# Patient Record
Sex: MALE | Race: WHITE | NOT HISPANIC OR LATINO | Employment: FULL TIME | URBAN - METROPOLITAN AREA
[De-identification: names, ages, dates, MRNs, and addresses within clinical notes are randomized per-mention and may not be internally consistent; named-entity substitution may affect disease eponyms.]

---

## 2023-03-20 NOTE — TELEPHONE ENCOUNTER
DIAGNOSIS: right shoulder    APPOINTMENT DATE: 4.4.23   NOTES STATUS DETAILS     Action March 20, 2023 2:56 PM    Action Taken No other significant past medical history

## 2023-04-03 DIAGNOSIS — M25.511 RIGHT SHOULDER PAIN: Primary | ICD-10-CM

## 2023-04-04 ENCOUNTER — ANCILLARY PROCEDURE (OUTPATIENT)
Dept: GENERAL RADIOLOGY | Facility: CLINIC | Age: 43
End: 2023-04-04
Attending: FAMILY MEDICINE
Payer: COMMERCIAL

## 2023-04-04 ENCOUNTER — OFFICE VISIT (OUTPATIENT)
Dept: ORTHOPEDICS | Facility: CLINIC | Age: 43
End: 2023-04-04
Payer: COMMERCIAL

## 2023-04-04 ENCOUNTER — PRE VISIT (OUTPATIENT)
Dept: ORTHOPEDICS | Facility: CLINIC | Age: 43
End: 2023-04-04

## 2023-04-04 DIAGNOSIS — M25.511 RIGHT SHOULDER PAIN, UNSPECIFIED CHRONICITY: ICD-10-CM

## 2023-04-04 DIAGNOSIS — M79.2 RADICULAR PAIN IN RIGHT ARM: ICD-10-CM

## 2023-04-04 DIAGNOSIS — M25.511 RIGHT SHOULDER PAIN: ICD-10-CM

## 2023-04-04 DIAGNOSIS — M50.30 DEGENERATION OF CERVICAL INTERVERTEBRAL DISC: Primary | ICD-10-CM

## 2023-04-04 PROCEDURE — 73030 X-RAY EXAM OF SHOULDER: CPT | Mod: RT | Performed by: RADIOLOGY

## 2023-04-04 PROCEDURE — 72040 X-RAY EXAM NECK SPINE 2-3 VW: CPT | Mod: GC | Performed by: RADIOLOGY

## 2023-04-04 PROCEDURE — 99203 OFFICE O/P NEW LOW 30 MIN: CPT | Performed by: FAMILY MEDICINE

## 2023-04-04 NOTE — PROGRESS NOTES
"Sports Medicine Clinic Visit    PCP: No Ref-Primary, Physician    Bean Dee is a 42 year old male who is seen  as self referral presenting with intermittent right radicular arm pain/tingling He states pain has radiated into the posterior aspect of his right shoulder and into his right elbow. He describes pain as a throbbing, pinching, tingling and numbness that at times can radiate down to the hand.  It happens intermittently.  He tends to notice it most often at home.  He denies neck pain.  Its not present when he first wakes in the morning.  He describes it more as a \"nuisance discomfort\".  Does not cause impingement shoulder pain or bother his shoulder movements at work.    Injury: unknown,. Reaching overhead and lifting heavy objects.     Location of Pain: right shoulder  Duration of Pain: 1.5 month(s)  Rating of Pain: 5/10  Pain is better with: massage and icy hot patches.   Pain is worse with: being at a resting position. He has a difficult time finding a comfortable position.  Additional Features: He right hand dominant. 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   Prior History of related problems: he mentions a few years he did sleep on his right side awkwardly and had similar issues but symptoms resolved in 7-10 days. He has not been seen for this issue before.     There were no vitals taken for this visit.          PMH:    Surgical History    Surgical History  Surgery Date Site/Laterality Comments   SEPTOPLASTY          Active problem list:  There is no problem list on file for this patient.      FH:  No family history on file.    SH:  Social History     Socioeconomic History     Marital status: Single     Spouse name: Not on file     Number of children: Not on file     Years of education: Not on file     Highest education level: Not on file   Occupational History     Not on file   Tobacco Use     Smoking status: " Not on file     Smokeless tobacco: Not on file   Substance and Sexual Activity     Alcohol use: Not on file     Drug use: Not on file     Sexual activity: Not on file   Other Topics Concern     Not on file   Social History Narrative     Not on file     Social Determinants of Health     Financial Resource Strain: Not on file   Food Insecurity: Not on file   Transportation Needs: Not on file   Physical Activity: Not on file   Stress: Not on file   Social Connections: Not on file   Intimate Partner Violence: Not on file   Housing Stability: Not on file       MEDS:  See EMR, reviewed  ALL:  See EMR, reviewed    REVIEW OF SYSTEMS:  CONSTITUTIONAL:NEGATIVE for fever, chills, change in weight  INTEGUMENTARY/SKIN: NEGATIVE for worrisome rashes, moles or lesions  EYES: NEGATIVE for vision changes or irritation  ENT/MOUTH: NEGATIVE for ear, mouth and throat problems  RESP:NEGATIVE for significant cough or SOB  BREAST: NEGATIVE for masses, tenderness or discharge  CV: NEGATIVE for chest pain, palpitations or peripheral edema  GI: NEGATIVE for nausea, abdominal pain, heartburn, or change in bowel habits  :NEGATIVE for frequency, dysuria, or hematuria  :NEGATIVE for frequency, dysuria, or hematuria  NEURO: NEGATIVE for weakness, dizziness or paresthesias  ENDOCRINE: NEGATIVE for temperature intolerance, skin/hair changes  HEME/ALLERGY/IMMUNE: NEGATIVE for bleeding problems  PSYCHIATRIC: NEGATIVE for changes in mood or affect        Objective: He has full range of motion at the cervical spine.  Neck extension causes some mild discomfort but no radicular pain.  He has 5 out of 5 strength bilaterally at deltoid, supraspinatus, infraspinatus, subscapularis, biceps, triceps, wrist extension, wrist flexion, grasp, digit he minimi strength.  Sensation is normal in the bilateral upper extremities.  No swelling in either upper extremity.  Phalen's sign and Tinel's sign are negative on the right.  No impingement signs at the right  shoulder.  Appropriate conversation and affect.      I personally viewed the patient x-rays of the right shoulder that shows no significant degenerative change and x-ray of the cervical spine that shows mild loss of cervical disc space at C5-C6 and C6-C7 with small anterior spurs.  No significant degenerative change.  X-rays over read with me by radiology.      Assessment: Cervical spine degenerative disc disease with intermittent right radicular arm discomfort    Plan: Patient would like to see physical therapy at [facility] for maintenance program for cervical spine.  We discussed that if the problem of cervical radicular symptoms of numbness or tingling radiating pain become more prominent or persistent that an MRI of the cervical spine would be a reasonable next step.

## 2023-04-04 NOTE — LETTER
"  4/4/2023      RE: Bean Dee  [address]  [place] [address]     Dear Colleague,    Thank you for referring your patient, Bean Dee, to the [facility] [place]. Please see a copy of my visit note below.    Sports Medicine Clinic Visit    PCP: No Ref-Primary, Physician    Bean Dee is a 42 year old male who is seen  as self referral presenting with intermittent right radicular arm pain/tingling He states pain has radiated into the posterior aspect of his right shoulder and into his right elbow. He describes pain as a throbbing, pinching, tingling and numbness that at times can radiate down to the hand.  It happens intermittently.  He tends to notice it most often at home.  He denies neck pain.  Its not present when he first wakes in the morning.  He describes it more as a \"nuisance discomfort\".  Does not cause impingement shoulder pain or bother his shoulder movements at work.    Injury: unknown,. Reaching overhead and lifting heavy objects.     Location of Pain: right shoulder  Duration of Pain: 1.5 month(s)  Rating of Pain: 5/10  Pain is better with: massage and icy hot patches.   Pain is worse with: being at a resting position. He has a difficult time finding a comfortable position.  Additional Features: He right hand dominant. 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   Prior History of related problems: he mentions a few years he did sleep on his right side awkwardly and had similar issues but symptoms resolved in 7-10 days. He has not been seen for this issue before.     There were no vitals taken for this visit.          PMH:    Surgical History    Surgical History  Surgery Date Site/Laterality Comments   SEPTOPLASTY          Active problem list:  There is no problem list on file for this patient.      FH:  No family history on file.    SH:  Social History "     Socioeconomic History     Marital status: Single     Spouse name: Not on file     Number of children: Not on file     Years of education: Not on file     Highest education level: Not on file   Occupational History     Not on file   Tobacco Use     Smoking status: Not on file     Smokeless tobacco: Not on file   Substance and Sexual Activity     Alcohol use: Not on file     Drug use: Not on file     Sexual activity: Not on file   Other Topics Concern     Not on file   Social History Narrative     Not on file     Social Determinants of Health     Financial Resource Strain: Not on file   Food Insecurity: Not on file   Transportation Needs: Not on file   Physical Activity: Not on file   Stress: Not on file   Social Connections: Not on file   Intimate Partner Violence: Not on file   Housing Stability: Not on file       MEDS:  See EMR, reviewed  ALL:  See EMR, reviewed    REVIEW OF SYSTEMS:  CONSTITUTIONAL:NEGATIVE for fever, chills, change in weight  INTEGUMENTARY/SKIN: NEGATIVE for worrisome rashes, moles or lesions  EYES: NEGATIVE for vision changes or irritation  ENT/MOUTH: NEGATIVE for ear, mouth and throat problems  RESP:NEGATIVE for significant cough or SOB  BREAST: NEGATIVE for masses, tenderness or discharge  CV: NEGATIVE for chest pain, palpitations or peripheral edema  GI: NEGATIVE for nausea, abdominal pain, heartburn, or change in bowel habits  :NEGATIVE for frequency, dysuria, or hematuria  :NEGATIVE for frequency, dysuria, or hematuria  NEURO: NEGATIVE for weakness, dizziness or paresthesias  ENDOCRINE: NEGATIVE for temperature intolerance, skin/hair changes  HEME/ALLERGY/IMMUNE: NEGATIVE for bleeding problems  PSYCHIATRIC: NEGATIVE for changes in mood or affect        Objective: He has full range of motion at the cervical spine.  Neck extension causes some mild discomfort but no radicular pain.  He has 5 out of 5 strength bilaterally at deltoid, supraspinatus, infraspinatus, subscapularis, biceps,  triceps, wrist extension, wrist flexion, grasp, digit he minimi strength.  Sensation is normal in the bilateral upper extremities.  No swelling in either upper extremity.  Phalen's sign and Tinel's sign are negative on the right.  No impingement signs at the right shoulder.  Appropriate conversation and affect.      I personally viewed the patient x-rays of the right shoulder that shows no significant degenerative change and x-ray of the cervical spine that shows mild loss of cervical disc space at C5-C6 and C6-C7 with small anterior spurs.  No significant degenerative change.  X-rays over read with me by radiology.      Assessment: Cervical spine degenerative disc disease with intermittent right radicular arm discomfort    Plan: Patient would like to see physical therapy at [facility] for maintenance program for cervical spine.  We discussed that if the problem of cervical radicular symptoms of numbness or tingling radiating pain become more prominent or persistent that an MRI of the cervical spine would be a reasonable next step.                    Again, thank you for allowing me to participate in the care of your patient.      Sincerely,    Juan Mejía MD

## 2023-04-20 ENCOUNTER — THERAPY VISIT (OUTPATIENT)
Dept: PHYSICAL THERAPY | Facility: CLINIC | Age: 43
End: 2023-04-20
Payer: COMMERCIAL

## 2023-04-20 DIAGNOSIS — M50.30 DDD (DEGENERATIVE DISC DISEASE), CERVICAL: ICD-10-CM

## 2023-04-20 PROCEDURE — 97161 PT EVAL LOW COMPLEX 20 MIN: CPT | Mod: GP | Performed by: PHYSICAL THERAPIST

## 2023-04-20 PROCEDURE — 97110 THERAPEUTIC EXERCISES: CPT | Mod: GP | Performed by: PHYSICAL THERAPIST

## 2023-04-20 NOTE — PROGRESS NOTES
[facility]    OUTPATIENT Physical Therapy ORTHOPEDIC EVALUATION  PLAN OF TREATMENT FOR OUTPATIENT REHABILITATION  (COMPLETE FOR INITIAL CLAIMS ONLY)  Patient's Last Name, First Name, M.I.  YOB: 1980  Bean Dee    Provider s Name:  [facility]   Medical Record No.  [id number]   Start of Care Date:  04/20/23   Onset Date:   03/05/23   Treatment Diagnosis:  cervical DDD Medical Diagnosis:  DDD (degenerative disc disease), cervical       Goals:     04/20/23 0500   Body Part   Goals listed below are for neck   Goal #1   Goal #1 lifting/carrying   Previous Functional Level No restrictions   Current Functional Level Can lift;an item to counter height weighing;an item to shoulder level weighing   Performance level child 20 pounds pain 5/10   STG Target Performance Lift an item to counter height weighing   Performance level child 20 pounds no sxs   Rationale for safe care of infant/toddler   Due date 05/11/23   LTG Target Performance Lift an item to shoulder level weighing   Performance Level child 20 pounds no sxs   Rationale for safe care of infant/toddler   Due date 06/19/23         Therapy Frequency:  1 time a week  Predicted Duration of Therapy Intervention:  6 weeks    Tom Monson, PT                 I CERTIFY THE NEED FOR THESE SERVICES FURNISHED UNDER        THIS PLAN OF TREATMENT AND WHILE UNDER MY CARE     (Physician attestation of this document indicates review and certification of the therapy plan).                     Certification Date From:  04/20/23   Certification Date To:  06/19/23    Referring Provider:  Juan Mejía    Initial Assessment        See Epic Evaluation SOC Date: 04/20/23

## 2023-04-20 NOTE — PROGRESS NOTES
Physical Therapy Initial Evaluation  Subjective:  HPI                  Physical Therapy Initial Examination/Evaluation  April 20, 2023    Bean Dee is a 42 year old male referred to physical therapy by Dr. Mejía for treatment of cervical pain, DDD, shoulder pain with Precautions/Restrictions/MD instructions none.  sxs are in the right superior shoulder and come down the arm.  This started as a pain and is now more tightness and tingling into the finger tips.  Currently the first three finger but this can vary  Pt has 3 yo, 7 yo and 13 yo    Subjective:  DOI/onset: 3/5/23   Acute Injury or Gradual Onset?: Gradual injury over time  Mechanism of Injury: unknown  Related PMH: depression, overweight, smoking-1 pack a day Previous Treatment: Rest Effect of prior treatment: good  Imaging: x-ray  Chief Complaint/Functional Limitations:   Pain with lifting, reaching and see below in therapy evaluation codes   Pain: rest 0 /10, activity 4/10 Location: superior shoulder and right UE Frequency: Intermittent Described as: shooting Alleviated by: Rest Progression of Symptoms: up and down Time of day when pain is worse: Activity related  Sleeping: No issues/uninterrupted   Occupation: adelina at [employer]  Job duties: prolonged standing, lifting/carrying, pushing/pulling  Current HEP/exercise regimen: walking  Patient's goals are see chief complaints keep sxs away, eliminate sxs    Other pertinent PMH/Red Flags: Depression, Overweight, Smoking   Barriers at home/work: None as reported by patient    Medications: None as reported by patient  General health as reported by patient: good      Objective:  System              Cervical/Thoracic Evaluation    AROM:  AROM Cervical:    Flexion:            100%  Extension:       75% and pain  Rotation:         Left: 75%     Right: 75%  Side Bend:      Left: 75%     Right:  75%    Strength: DNF fatigue after 10,  MMT rhomboids 4/5 MUKESH  Headaches: none  Cervical Myotomes:   normal                        Cervical Palpation:  normal                                                      General     ROS    Assessment/Plan:    Patient is a 42 year old male with cervical complaints.    Patient has the following significant findings with corresponding treatment plan.                Diagnosis 1:  Cervical DDD  Pain -  hot/cold therapy, manual therapy, self management, education and home program  Decreased ROM/flexibility - manual therapy and therapeutic exercise  Decreased strength - therapeutic exercise and therapeutic activities  Decreased function - therapeutic activities  Impaired posture - neuro re-education    Therapy Evaluation Codes:   1) History comprised of:   Personal factors that impact the plan of care:      None.    Comorbidity factors that impact the plan of care are:      None.     Medications impacting care: None.  2) Examination of Body Systems comprised of:   Body structures and functions that impact the plan of care:      Cervical spine.   Activity limitations that impact the plan of care are:      Driving, Dressing, Lifting and Sitting.  3) Clinical presentation characteristics are:   Stable/Uncomplicated.  4) Decision-Making    Low complexity using standardized patient assessment instrument and/or measureable assessment of functional outcome.  Cumulative Therapy Evaluation is: Low complexity.    Previous and current functional limitations:  (See Goal Flow Sheet for this information)    Short term and Long term goals: (See Goal Flow Sheet for this information)     Communication ability:  Patient appears to be able to clearly communicate and understand verbal and written communication and follow directions correctly.  Treatment Explanation - The following has been discussed with the patient:   RX ordered/plan of care  Anticipated outcomes  Possible risks and side effects  This patient would benefit from PT intervention to resume normal activities.   Rehab potential is  good.    Frequency:  1 X week, once daily  Duration:  for 8 weeks  Discharge Plan:  Achieve all LTG.  Independent in home treatment program.  Reach maximal therapeutic benefit.    Please refer to the daily flowsheet for treatment today, total treatment time and time spent performing 1:1 timed codes.

## 2023-11-09 PROBLEM — M50.30 DDD (DEGENERATIVE DISC DISEASE), CERVICAL: Status: RESOLVED | Noted: 2023-04-20 | Resolved: 2023-11-09
